# Patient Record
Sex: MALE | Race: OTHER | NOT HISPANIC OR LATINO
[De-identification: names, ages, dates, MRNs, and addresses within clinical notes are randomized per-mention and may not be internally consistent; named-entity substitution may affect disease eponyms.]

---

## 2017-02-28 ENCOUNTER — APPOINTMENT (OUTPATIENT)
Dept: CARDIOLOGY | Facility: CLINIC | Age: 77
End: 2017-02-28

## 2017-03-02 ENCOUNTER — APPOINTMENT (OUTPATIENT)
Dept: CARDIOLOGY | Facility: CLINIC | Age: 77
End: 2017-03-02

## 2017-03-02 ENCOUNTER — NON-APPOINTMENT (OUTPATIENT)
Age: 77
End: 2017-03-02

## 2017-03-02 VITALS — DIASTOLIC BLOOD PRESSURE: 104 MMHG | SYSTOLIC BLOOD PRESSURE: 185 MMHG

## 2017-03-02 VITALS
DIASTOLIC BLOOD PRESSURE: 97 MMHG | WEIGHT: 238.4 LBS | OXYGEN SATURATION: 97 % | TEMPERATURE: 97.4 F | BODY MASS INDEX: 31.6 KG/M2 | SYSTOLIC BLOOD PRESSURE: 195 MMHG | HEART RATE: 54 BPM | HEIGHT: 73 IN

## 2017-03-02 LAB — INR PPP: 1.8

## 2017-03-03 LAB
ALBUMIN SERPL ELPH-MCNC: 4 G/DL
ALP BLD-CCNC: 81 U/L
ALT SERPL-CCNC: 22 U/L
ANION GAP SERPL CALC-SCNC: 18 MMOL/L
AST SERPL-CCNC: 29 U/L
BASOPHILS # BLD AUTO: 0.01 K/UL
BASOPHILS NFR BLD AUTO: 0.2 %
BILIRUB SERPL-MCNC: 0.6 MG/DL
BUN SERPL-MCNC: 17 MG/DL
CALCIUM SERPL-MCNC: 9.2 MG/DL
CHLORIDE SERPL-SCNC: 105 MMOL/L
CHOLEST SERPL-MCNC: 249 MG/DL
CHOLEST/HDLC SERPL: 3.1 RATIO
CO2 SERPL-SCNC: 21 MMOL/L
CREAT SERPL-MCNC: 1.09 MG/DL
EOSINOPHIL # BLD AUTO: 0.12 K/UL
EOSINOPHIL NFR BLD AUTO: 2.5 %
GLUCOSE SERPL-MCNC: 101 MG/DL
HBA1C MFR BLD HPLC: 5.6 %
HCT VFR BLD CALC: 46.6 %
HDLC SERPL-MCNC: 81 MG/DL
HGB BLD-MCNC: 15.2 G/DL
IMM GRANULOCYTES NFR BLD AUTO: 0 %
LDLC SERPL CALC-MCNC: 154 MG/DL
LYMPHOCYTES # BLD AUTO: 0.67 K/UL
LYMPHOCYTES NFR BLD AUTO: 14 %
MAN DIFF?: NORMAL
MCHC RBC-ENTMCNC: 30.1 PG
MCHC RBC-ENTMCNC: 32.6 GM/DL
MCV RBC AUTO: 92.3 FL
MONOCYTES # BLD AUTO: 0.48 K/UL
MONOCYTES NFR BLD AUTO: 10.1 %
NEUTROPHILS # BLD AUTO: 3.49 K/UL
NEUTROPHILS NFR BLD AUTO: 73.2 %
PLATELET # BLD AUTO: 217 K/UL
POTASSIUM SERPL-SCNC: 4.7 MMOL/L
PROT SERPL-MCNC: 6.5 G/DL
PSA FREE FLD-MCNC: 25.4 %
PSA FREE SERPL-MCNC: 0.77 NG/ML
PSA SERPL-MCNC: 3.03 NG/ML
RBC # BLD: 5.05 M/UL
RBC # FLD: 14.2 %
SODIUM SERPL-SCNC: 144 MMOL/L
T4 SERPL-MCNC: 6 UG/DL
TRIGL SERPL-MCNC: 72 MG/DL
TSH SERPL-ACNC: 2.49 UIU/ML
WBC # FLD AUTO: 4.77 K/UL

## 2017-06-06 ENCOUNTER — NON-APPOINTMENT (OUTPATIENT)
Age: 77
End: 2017-06-06

## 2017-06-06 ENCOUNTER — APPOINTMENT (OUTPATIENT)
Dept: CARDIOLOGY | Facility: CLINIC | Age: 77
End: 2017-06-06

## 2017-06-06 VITALS
WEIGHT: 231 LBS | HEART RATE: 63 BPM | BODY MASS INDEX: 30.48 KG/M2 | OXYGEN SATURATION: 96 % | SYSTOLIC BLOOD PRESSURE: 180 MMHG | DIASTOLIC BLOOD PRESSURE: 82 MMHG

## 2017-06-06 VITALS — SYSTOLIC BLOOD PRESSURE: 146 MMHG | DIASTOLIC BLOOD PRESSURE: 86 MMHG

## 2017-06-06 LAB — INR PPP: 2.3 RATIO

## 2017-09-07 ENCOUNTER — NON-APPOINTMENT (OUTPATIENT)
Age: 77
End: 2017-09-07

## 2017-09-07 ENCOUNTER — APPOINTMENT (OUTPATIENT)
Dept: CARDIOLOGY | Facility: CLINIC | Age: 77
End: 2017-09-07
Payer: MEDICARE

## 2017-09-07 VITALS
OXYGEN SATURATION: 97 % | DIASTOLIC BLOOD PRESSURE: 84 MMHG | WEIGHT: 223 LBS | HEART RATE: 60 BPM | HEIGHT: 73 IN | BODY MASS INDEX: 29.55 KG/M2 | SYSTOLIC BLOOD PRESSURE: 142 MMHG

## 2017-09-07 PROCEDURE — 93000 ELECTROCARDIOGRAM COMPLETE: CPT

## 2017-09-07 PROCEDURE — 99215 OFFICE O/P EST HI 40 MIN: CPT

## 2017-09-18 ENCOUNTER — RX RENEWAL (OUTPATIENT)
Age: 77
End: 2017-09-18

## 2017-10-30 ENCOUNTER — APPOINTMENT (OUTPATIENT)
Dept: CARDIOLOGY | Facility: CLINIC | Age: 77
End: 2017-10-30
Payer: MEDICARE

## 2017-10-30 PROCEDURE — G0008: CPT

## 2017-10-30 PROCEDURE — 85610 PROTHROMBIN TIME: CPT | Mod: QW

## 2017-10-30 PROCEDURE — 99211 OFF/OP EST MAY X REQ PHY/QHP: CPT

## 2017-10-30 PROCEDURE — 90686 IIV4 VACC NO PRSV 0.5 ML IM: CPT

## 2017-10-31 VITALS — HEART RATE: 59 BPM | OXYGEN SATURATION: 97 %

## 2017-10-31 LAB — INR PPP: 2.5 RATIO

## 2017-12-11 ENCOUNTER — APPOINTMENT (OUTPATIENT)
Dept: CARDIOLOGY | Facility: CLINIC | Age: 77
End: 2017-12-11
Payer: MEDICARE

## 2017-12-11 ENCOUNTER — NON-APPOINTMENT (OUTPATIENT)
Age: 77
End: 2017-12-11

## 2017-12-11 VITALS
WEIGHT: 223 LBS | SYSTOLIC BLOOD PRESSURE: 167 MMHG | HEART RATE: 62 BPM | HEIGHT: 73 IN | BODY MASS INDEX: 29.55 KG/M2 | OXYGEN SATURATION: 94 % | DIASTOLIC BLOOD PRESSURE: 85 MMHG

## 2017-12-11 VITALS — DIASTOLIC BLOOD PRESSURE: 84 MMHG | SYSTOLIC BLOOD PRESSURE: 142 MMHG

## 2017-12-11 VITALS — HEART RATE: 57 BPM

## 2017-12-11 VITALS — OXYGEN SATURATION: 98 %

## 2017-12-11 LAB — INR PPP: 1.6 RATIO

## 2017-12-11 PROCEDURE — 93000 ELECTROCARDIOGRAM COMPLETE: CPT

## 2017-12-11 PROCEDURE — 85610 PROTHROMBIN TIME: CPT | Mod: QW

## 2017-12-11 PROCEDURE — 99214 OFFICE O/P EST MOD 30 MIN: CPT

## 2017-12-30 ENCOUNTER — RX RENEWAL (OUTPATIENT)
Age: 77
End: 2017-12-30

## 2018-01-18 ENCOUNTER — APPOINTMENT (OUTPATIENT)
Dept: CARDIOLOGY | Facility: CLINIC | Age: 78
End: 2018-01-18
Payer: MEDICARE

## 2018-01-18 VITALS — SYSTOLIC BLOOD PRESSURE: 122 MMHG | OXYGEN SATURATION: 98 % | DIASTOLIC BLOOD PRESSURE: 70 MMHG | HEART RATE: 58 BPM

## 2018-01-18 LAB — INR PPP: 2.2 RATIO

## 2018-01-18 PROCEDURE — 85610 PROTHROMBIN TIME: CPT | Mod: QW

## 2018-01-18 PROCEDURE — 99211 OFF/OP EST MAY X REQ PHY/QHP: CPT

## 2018-02-21 ENCOUNTER — APPOINTMENT (OUTPATIENT)
Dept: CARDIOLOGY | Facility: CLINIC | Age: 78
End: 2018-02-21
Payer: MEDICARE

## 2018-02-21 VITALS — DIASTOLIC BLOOD PRESSURE: 62 MMHG | HEART RATE: 56 BPM | SYSTOLIC BLOOD PRESSURE: 124 MMHG | OXYGEN SATURATION: 97 %

## 2018-02-21 LAB — INR PPP: 2.2 RATIO

## 2018-02-21 PROCEDURE — 99211 OFF/OP EST MAY X REQ PHY/QHP: CPT

## 2018-02-21 PROCEDURE — 85610 PROTHROMBIN TIME: CPT | Mod: QW

## 2018-03-21 ENCOUNTER — APPOINTMENT (OUTPATIENT)
Dept: CARDIOLOGY | Facility: CLINIC | Age: 78
End: 2018-03-21

## 2018-04-09 ENCOUNTER — RX RENEWAL (OUTPATIENT)
Age: 78
End: 2018-04-09

## 2018-04-20 ENCOUNTER — OTHER (OUTPATIENT)
Age: 78
End: 2018-04-20

## 2018-04-24 ENCOUNTER — APPOINTMENT (OUTPATIENT)
Dept: CARDIOLOGY | Facility: CLINIC | Age: 78
End: 2018-04-24
Payer: MEDICARE

## 2018-04-24 ENCOUNTER — NON-APPOINTMENT (OUTPATIENT)
Age: 78
End: 2018-04-24

## 2018-04-24 VITALS
OXYGEN SATURATION: 97 % | HEART RATE: 66 BPM | SYSTOLIC BLOOD PRESSURE: 126 MMHG | BODY MASS INDEX: 29.69 KG/M2 | HEIGHT: 73 IN | WEIGHT: 224 LBS | DIASTOLIC BLOOD PRESSURE: 78 MMHG

## 2018-04-24 DIAGNOSIS — Z86.39 PERSONAL HISTORY OF OTHER ENDOCRINE, NUTRITIONAL AND METABOLIC DISEASE: ICD-10-CM

## 2018-04-24 LAB — INR PPP: 2.1 RATIO

## 2018-04-24 PROCEDURE — 93000 ELECTROCARDIOGRAM COMPLETE: CPT

## 2018-04-24 PROCEDURE — 99215 OFFICE O/P EST HI 40 MIN: CPT

## 2018-05-25 ENCOUNTER — APPOINTMENT (OUTPATIENT)
Dept: CARDIOLOGY | Facility: CLINIC | Age: 78
End: 2018-05-25
Payer: MEDICARE

## 2018-05-25 LAB — INR PPP: 1.8 RATIO

## 2018-05-25 PROCEDURE — 99211 OFF/OP EST MAY X REQ PHY/QHP: CPT

## 2018-05-25 PROCEDURE — 85610 PROTHROMBIN TIME: CPT | Mod: QW

## 2018-05-26 ENCOUNTER — RX RENEWAL (OUTPATIENT)
Age: 78
End: 2018-05-26

## 2018-06-11 ENCOUNTER — APPOINTMENT (OUTPATIENT)
Dept: INTERNAL MEDICINE | Facility: CLINIC | Age: 78
End: 2018-06-11
Payer: MEDICARE

## 2018-06-11 ENCOUNTER — LABORATORY RESULT (OUTPATIENT)
Age: 78
End: 2018-06-11

## 2018-06-11 PROCEDURE — 45380 COLONOSCOPY AND BIOPSY: CPT | Mod: 59

## 2018-06-11 PROCEDURE — 45385 COLONOSCOPY W/LESION REMOVAL: CPT

## 2018-07-25 ENCOUNTER — NON-APPOINTMENT (OUTPATIENT)
Age: 78
End: 2018-07-25

## 2018-07-25 ENCOUNTER — APPOINTMENT (OUTPATIENT)
Dept: CARDIOLOGY | Facility: CLINIC | Age: 78
End: 2018-07-25
Payer: MEDICARE

## 2018-07-25 VITALS
WEIGHT: 220 LBS | OXYGEN SATURATION: 97 % | DIASTOLIC BLOOD PRESSURE: 85 MMHG | BODY MASS INDEX: 29.16 KG/M2 | SYSTOLIC BLOOD PRESSURE: 148 MMHG | HEIGHT: 73 IN | HEART RATE: 62 BPM

## 2018-07-25 DIAGNOSIS — Z12.11 ENCOUNTER FOR SCREENING FOR MALIGNANT NEOPLASM OF COLON: ICD-10-CM

## 2018-07-25 LAB — INR PPP: 2.2 RATIO

## 2018-07-25 PROCEDURE — 93000 ELECTROCARDIOGRAM COMPLETE: CPT

## 2018-07-25 PROCEDURE — 99214 OFFICE O/P EST MOD 30 MIN: CPT

## 2018-09-02 ENCOUNTER — RX RENEWAL (OUTPATIENT)
Age: 78
End: 2018-09-02

## 2018-11-24 ENCOUNTER — RX RENEWAL (OUTPATIENT)
Age: 78
End: 2018-11-24

## 2018-11-28 ENCOUNTER — NON-APPOINTMENT (OUTPATIENT)
Age: 78
End: 2018-11-28

## 2018-11-28 ENCOUNTER — APPOINTMENT (OUTPATIENT)
Dept: CARDIOLOGY | Facility: CLINIC | Age: 78
End: 2018-11-28
Payer: MEDICARE

## 2018-11-28 VITALS — DIASTOLIC BLOOD PRESSURE: 84 MMHG | SYSTOLIC BLOOD PRESSURE: 152 MMHG

## 2018-11-28 VITALS
BODY MASS INDEX: 30.22 KG/M2 | HEART RATE: 60 BPM | DIASTOLIC BLOOD PRESSURE: 84 MMHG | SYSTOLIC BLOOD PRESSURE: 175 MMHG | WEIGHT: 228 LBS | OXYGEN SATURATION: 97 % | HEIGHT: 73 IN

## 2018-11-28 LAB — INR PPP: 3 RATIO

## 2018-11-28 PROCEDURE — 99215 OFFICE O/P EST HI 40 MIN: CPT

## 2018-11-28 PROCEDURE — 93000 ELECTROCARDIOGRAM COMPLETE: CPT

## 2018-11-28 NOTE — DISCUSSION/SUMMARY
[FreeTextEntry1] : The patient was examined. His blood pressure was 152/84 .His pulse was 62. His lungs were clear to auscultation. Cardiac exam was negative for murmurs rubs or gallops. His extremities showed no cyanosis or clubbing.  His EKG showed sinus rhythm,and an IVCD of the left bundle branch block type.. His INR today was 3.0 The patient was told to stop his warfarin for 5 days prior to having Mohs surgery. He is medically cleared for the surgery should he require clearance..He will return in 4 months, or earlier if needed. He will return for an INR approximately one month. We will continue to manage Coumadin in this medically complicated and poorly compliant patient.

## 2018-11-28 NOTE — REASON FOR VISIT
[FreeTextEntry1] : The patient comes in for follow-up of His hypertension,deep vein thrombosis, long-term anticoagulant use, hyperlipidemia, and osteoarthritis. He denies any chest pains, shortness of breath, palpitations, or leg pains or swelling.  His INR today was  3.0 The patient went to a dermatologist about a skin lesion turned out to be skin cancer. He will be going for Mohs surgery.

## 2018-11-28 NOTE — PHYSICAL EXAM
[General Appearance - Well Developed] : well developed [Normal Appearance] : normal appearance [Well Groomed] : well groomed [General Appearance - Well Nourished] : well nourished [No Deformities] : no deformities [General Appearance - In No Acute Distress] : no acute distress [Normal Conjunctiva] : the conjunctiva exhibited no abnormalities [Eyelids - No Xanthelasma] : the eyelids demonstrated no xanthelasmas [Normal Oral Mucosa] : normal oral mucosa [No Oral Pallor] : no oral pallor [No Oral Cyanosis] : no oral cyanosis [Normal Jugular Venous A Waves Present] : normal jugular venous A waves present [Normal Jugular Venous V Waves Present] : normal jugular venous V waves present [No Jugular Venous Cardona A Waves] : no jugular venous cardona A waves [Respiration, Rhythm And Depth] : normal respiratory rhythm and effort [Exaggerated Use Of Accessory Muscles For Inspiration] : no accessory muscle use [Auscultation Breath Sounds / Voice Sounds] : lungs were clear to auscultation bilaterally [Heart Rate And Rhythm] : heart rate and rhythm were normal [Heart Sounds] : normal S1 and S2 [Murmurs] : no murmurs present [Abdomen Soft] : soft [Abdomen Tenderness] : non-tender [Abdomen Mass (___ Cm)] : no abdominal mass palpated [Abnormal Walk] : normal gait [Gait - Sufficient For Exercise Testing] : the gait was sufficient for exercise testing [Nail Clubbing] : no clubbing of the fingernails [Cyanosis, Localized] : no localized cyanosis [Petechial Hemorrhages (___cm)] : no petechial hemorrhages [Skin Color & Pigmentation] : normal skin color and pigmentation [] : no rash [No Venous Stasis] : no venous stasis [Skin Lesions] : no skin lesions [No Skin Ulcers] : no skin ulcer [No Xanthoma] : no  xanthoma was observed [Oriented To Time, Place, And Person] : oriented to person, place, and time [Affect] : the affect was normal [Mood] : the mood was normal [No Anxiety] : not feeling anxious [FreeTextEntry1] : There is decreased hearing bilaterally.

## 2018-11-28 NOTE — REVIEW OF SYSTEMS
[Loss Of Hearing] : hearing loss [see HPI] : see HPI [Joint Pain] : joint pain [Negative] : Heme/Lymph [Shortness Of Breath] : no shortness of breath [Chest Pain] : no chest pain [Palpitations] : no palpitations

## 2019-01-14 ENCOUNTER — OTHER (OUTPATIENT)
Age: 79
End: 2019-01-14

## 2019-01-14 ENCOUNTER — APPOINTMENT (OUTPATIENT)
Dept: CARDIOLOGY | Facility: CLINIC | Age: 79
End: 2019-01-14
Payer: MEDICARE

## 2019-01-14 LAB — INR PPP: 2.1 RATIO

## 2019-01-14 PROCEDURE — 93793 ANTICOAG MGMT PT WARFARIN: CPT

## 2019-01-14 PROCEDURE — 85610 PROTHROMBIN TIME: CPT | Mod: QW

## 2019-01-15 LAB
ALBUMIN SERPL ELPH-MCNC: 4.7 G/DL
ALP BLD-CCNC: 83 U/L
ALT SERPL-CCNC: 26 U/L
ANION GAP SERPL CALC-SCNC: 9 MMOL/L
AST SERPL-CCNC: 27 U/L
BASOPHILS # BLD AUTO: 0.01 K/UL
BASOPHILS NFR BLD AUTO: 0.2 %
BILIRUB SERPL-MCNC: 0.5 MG/DL
BUN SERPL-MCNC: 29 MG/DL
CALCIUM SERPL-MCNC: 9.7 MG/DL
CHLORIDE SERPL-SCNC: 104 MMOL/L
CHOLEST SERPL-MCNC: 236 MG/DL
CHOLEST/HDLC SERPL: 2.7 RATIO
CO2 SERPL-SCNC: 29 MMOL/L
CREAT SERPL-MCNC: 1.3 MG/DL
EOSINOPHIL # BLD AUTO: 0.16 K/UL
EOSINOPHIL NFR BLD AUTO: 3.5 %
ESTIMATED AVERAGE GLUCOSE: 108 MG/DL
GLUCOSE SERPL-MCNC: 95 MG/DL
HBA1C MFR BLD HPLC: 5.4 %
HCT VFR BLD CALC: 47.1 %
HDLC SERPL-MCNC: 89 MG/DL
HGB BLD-MCNC: 14.8 G/DL
IMM GRANULOCYTES NFR BLD AUTO: 0.6 %
LDLC SERPL CALC-MCNC: 132 MG/DL
LYMPHOCYTES # BLD AUTO: 0.8 K/UL
LYMPHOCYTES NFR BLD AUTO: 17.3 %
MAN DIFF?: NORMAL
MCHC RBC-ENTMCNC: 29.8 PG
MCHC RBC-ENTMCNC: 31.4 GM/DL
MCV RBC AUTO: 94.8 FL
MONOCYTES # BLD AUTO: 0.47 K/UL
MONOCYTES NFR BLD AUTO: 10.2 %
NEUTROPHILS # BLD AUTO: 3.16 K/UL
NEUTROPHILS NFR BLD AUTO: 68.2 %
PLATELET # BLD AUTO: 240 K/UL
POTASSIUM SERPL-SCNC: 4.4 MMOL/L
PROT SERPL-MCNC: 7.1 G/DL
PSA FREE FLD-MCNC: 18 %
PSA FREE SERPL-MCNC: 0.77 NG/ML
PSA SERPL-MCNC: 4.22 NG/ML
RBC # BLD: 4.97 M/UL
RBC # FLD: 14.9 %
SODIUM SERPL-SCNC: 142 MMOL/L
T4 SERPL-MCNC: 6.6 UG/DL
TRIGL SERPL-MCNC: 73 MG/DL
TSH SERPL-ACNC: 2.51 UIU/ML
WBC # FLD AUTO: 4.63 K/UL

## 2019-02-13 ENCOUNTER — APPOINTMENT (OUTPATIENT)
Dept: CARDIOLOGY | Facility: CLINIC | Age: 79
End: 2019-02-13
Payer: MEDICARE

## 2019-02-13 VITALS
OXYGEN SATURATION: 95 % | HEART RATE: 64 BPM | DIASTOLIC BLOOD PRESSURE: 80 MMHG | HEIGHT: 73 IN | BODY MASS INDEX: 29.95 KG/M2 | SYSTOLIC BLOOD PRESSURE: 141 MMHG | WEIGHT: 226 LBS

## 2019-02-13 LAB — INR PPP: 1.2 RATIO

## 2019-02-13 PROCEDURE — 99214 OFFICE O/P EST MOD 30 MIN: CPT

## 2019-02-13 PROCEDURE — 85610 PROTHROMBIN TIME: CPT | Mod: QW

## 2019-02-15 ENCOUNTER — APPOINTMENT (OUTPATIENT)
Dept: CARDIOLOGY | Facility: CLINIC | Age: 79
End: 2019-02-15
Payer: MEDICARE

## 2019-02-15 VITALS
HEART RATE: 70 BPM | HEIGHT: 73 IN | DIASTOLIC BLOOD PRESSURE: 77 MMHG | BODY MASS INDEX: 29.95 KG/M2 | OXYGEN SATURATION: 97 % | SYSTOLIC BLOOD PRESSURE: 162 MMHG | WEIGHT: 226 LBS

## 2019-02-15 PROCEDURE — 93970 EXTREMITY STUDY: CPT

## 2019-02-15 PROCEDURE — 99215 OFFICE O/P EST HI 40 MIN: CPT

## 2019-02-15 NOTE — REASON FOR VISIT
[Acute Exacerbation] : an acute exacerbation of [FreeTextEntry2] : DVT [FreeTextEntry1] : The patient comes in for follow-up of his hypertension, deep vein thrombosis, long-term anticoagulant use, hyperlipidemia, and osteoarthritis. He denies any chest pains, shortness of breath, palpitations. His INR two days ago was 1.2. The patient went to a dermatologist about a skin lesion turned out to be skin cancer. He went  for Mohs surgery. He stopped his Coumadin for 10 days. He noticed his legs were swollen and he went on the Internet and he was under the impression that maybe the Coumadin was too high a dose so he lowered it to 7-1/2 mg after taking 10 mg twice. He now comes for followup.\par \par Patient seen to have bilateral LE DVT on venous duplex this AM.\par He has no chest pains or shortness of breath. No pleuritic chest discomfort.

## 2019-02-15 NOTE — REASON FOR VISIT
[FreeTextEntry1] : The patient comes in for follow-up of his hypertension,deep vein thrombosis, long-term anticoagulant use, hyperlipidemia, and osteoarthritis. He denies any chest pains, shortness of breath, palpitations, His INR today was 1.2  The patient went to a dermatologist about a skin lesion turned out to be skin cancer. He went  for Mohs surgery. He stopped his Coumadin for 10 days.He noticed his legs were swollen and he went on the Internet and he was under the impression that maybe the Coumadin was too high a dose so he lowered it to 7-1/2 mg after taking 10 mg twice. He now comes for followup.

## 2019-02-15 NOTE — PHYSICAL EXAM
[General Appearance - Well Developed] : well developed [Normal Appearance] : normal appearance [Well Groomed] : well groomed [General Appearance - Well Nourished] : well nourished [No Deformities] : no deformities [General Appearance - In No Acute Distress] : no acute distress [Normal Conjunctiva] : the conjunctiva exhibited no abnormalities [Eyelids - No Xanthelasma] : the eyelids demonstrated no xanthelasmas [Normal Oral Mucosa] : normal oral mucosa [No Oral Pallor] : no oral pallor [No Oral Cyanosis] : no oral cyanosis [Normal Jugular Venous A Waves Present] : normal jugular venous A waves present [Normal Jugular Venous V Waves Present] : normal jugular venous V waves present [No Jugular Venous Cardona A Waves] : no jugular venous cardona A waves [Respiration, Rhythm And Depth] : normal respiratory rhythm and effort [Exaggerated Use Of Accessory Muscles For Inspiration] : no accessory muscle use [Auscultation Breath Sounds / Voice Sounds] : lungs were clear to auscultation bilaterally [Heart Rate And Rhythm] : heart rate and rhythm were normal [Heart Sounds] : normal S1 and S2 [Murmurs] : no murmurs present [Abdomen Soft] : soft [Abdomen Tenderness] : non-tender [Abdomen Mass (___ Cm)] : no abdominal mass palpated [Abnormal Walk] : normal gait [Gait - Sufficient For Exercise Testing] : the gait was sufficient for exercise testing [Nail Clubbing] : no clubbing of the fingernails [Cyanosis, Localized] : no localized cyanosis [Petechial Hemorrhages (___cm)] : no petechial hemorrhages [Skin Color & Pigmentation] : normal skin color and pigmentation [] : no rash [No Venous Stasis] : no venous stasis [Skin Lesions] : no skin lesions [No Skin Ulcers] : no skin ulcer [No Xanthoma] : no  xanthoma was observed [Oriented To Time, Place, And Person] : oriented to person, place, and time [Affect] : the affect was normal [Mood] : the mood was normal [No Anxiety] : not feeling anxious [FreeTextEntry1] : bilateral lower extremity edema

## 2019-02-15 NOTE — DISCUSSION/SUMMARY
[FreeTextEntry1] : Patient seen for visit after being seen to have bilateral DVT.\par Heart rate is normal. Oxygen saturation is normal on room air.\par \par The patient was examined. His blood pressure was 162/77 mmHg. His pulse was 70 bpm. His lungs were clear to auscultation. Cardiac exam was negative for murmurs rubs or gallops. His extremities showed no cyanosis or clubbing. The patient was told to stop his Coumadin (he has not taken any since Tuesday). He has taken two days of Xarelto 20 mg. We will now start him on Xarelto 15 mg BID for 21 days (three weeks of samples provided) and then either use Lovenox to bridge back to warfarin or continue with Xarelto at 20 mg daily thereafter. He will return in three weeks to see Kamari López MD.

## 2019-02-15 NOTE — DISCUSSION/SUMMARY
[FreeTextEntry1] : The patient was examined. His blood pressure was 152/84 .His pulse was 62. His lungs were clear to auscultation. Cardiac exam was negative for murmurs rubs or gallops. His extremities showed no cyanosis or clubbing. . He has clots in superficial veins in his lower extremities.His INR today was 1.2 The patient was told to stop his Coumadin. We will start him on Xarelto  20 mg daily. He will return in one month earlier if needed. We will get a venous Doppler to check for deep vein thrombosis.

## 2019-03-11 ENCOUNTER — MEDICATION RENEWAL (OUTPATIENT)
Age: 79
End: 2019-03-11

## 2019-03-11 ENCOUNTER — CLINICAL ADVICE (OUTPATIENT)
Age: 79
End: 2019-03-11

## 2019-03-13 ENCOUNTER — APPOINTMENT (OUTPATIENT)
Dept: CARDIOLOGY | Facility: CLINIC | Age: 79
End: 2019-03-13

## 2019-04-01 ENCOUNTER — NON-APPOINTMENT (OUTPATIENT)
Age: 79
End: 2019-04-01

## 2019-04-01 ENCOUNTER — APPOINTMENT (OUTPATIENT)
Dept: CARDIOLOGY | Facility: CLINIC | Age: 79
End: 2019-04-01
Payer: MEDICARE

## 2019-04-01 VITALS — DIASTOLIC BLOOD PRESSURE: 90 MMHG | SYSTOLIC BLOOD PRESSURE: 156 MMHG

## 2019-04-01 VITALS
SYSTOLIC BLOOD PRESSURE: 169 MMHG | WEIGHT: 232 LBS | HEART RATE: 63 BPM | OXYGEN SATURATION: 95 % | DIASTOLIC BLOOD PRESSURE: 95 MMHG | BODY MASS INDEX: 30.61 KG/M2

## 2019-04-01 LAB — INR PPP: 1 RATIO

## 2019-04-01 PROCEDURE — 93000 ELECTROCARDIOGRAM COMPLETE: CPT

## 2019-04-01 PROCEDURE — 99214 OFFICE O/P EST MOD 30 MIN: CPT

## 2019-04-01 NOTE — REASON FOR VISIT
[Acute Exacerbation] : an acute exacerbation of [FreeTextEntry2] : DVT [FreeTextEntry1] : The patient comes in for follow-up of his hypertension, deep vein thrombosis, long-term anticoagulant use, hyperlipidemia, and osteoarthritis. He denies any chest pains, shortness of breath, palpitations. His INR two days ago was 1.2. The patient went to a dermatologist about a skin lesion turned out to be skin cancer. He went  for Mohs surgery. He stopped his Coumadin for 10 days. He noticed his legs were swollen and he went on the Internet and he was under the impression that maybe the Coumadin was too high a dose so he lowered it to 7-1/2 mg after taking 10 mg twice. He now comes for followup.\par \par Patient seen to have bilateral LE DVT on venous duplex \par He has no chest pains or shortness of breath. No pleuritic chest discomfort.\par April 1, 2019: Patient had blood in his urine and he stopped the Xarelto  20 mg for one day. The blood went away and he restarted it. He denies any leg pains, chest pains, or shortness of breath.

## 2019-04-01 NOTE — DISCUSSION/SUMMARY
[FreeTextEntry1] : \par \par The patient was examined. His blood pressure was 156/90 mmHg. His pulse was 63 bpm. His lungs were clear to auscultation. Cardiac exam was negative for murmurs rubs or gallops. His extremities showed no cyanosis or clubbing.His EKG showed normal sinus rhythm with an IVCD of the left bundle branch block  type. No acute changes were seen. We will continue him on Xarelto at 20 mg daily.  He will return in 1 month.

## 2019-04-09 ENCOUNTER — RX RENEWAL (OUTPATIENT)
Age: 79
End: 2019-04-09

## 2019-05-01 ENCOUNTER — APPOINTMENT (OUTPATIENT)
Dept: CARDIOLOGY | Facility: CLINIC | Age: 79
End: 2019-05-01
Payer: MEDICARE

## 2019-05-01 ENCOUNTER — NON-APPOINTMENT (OUTPATIENT)
Age: 79
End: 2019-05-01

## 2019-05-01 VITALS
OXYGEN SATURATION: 97 % | SYSTOLIC BLOOD PRESSURE: 155 MMHG | HEIGHT: 73 IN | DIASTOLIC BLOOD PRESSURE: 93 MMHG | HEART RATE: 66 BPM | WEIGHT: 232 LBS | BODY MASS INDEX: 30.75 KG/M2

## 2019-05-01 DIAGNOSIS — K08.89 OTHER SPECIFIED DISORDERS OF TEETH AND SUPPORTING STRUCTURES: ICD-10-CM

## 2019-05-01 DIAGNOSIS — Z87.448 PERSONAL HISTORY OF OTHER DISEASES OF URINARY SYSTEM: ICD-10-CM

## 2019-05-01 LAB — INR PPP: 1.1 RATIO

## 2019-05-01 PROCEDURE — 99214 OFFICE O/P EST MOD 30 MIN: CPT

## 2019-05-01 PROCEDURE — 93000 ELECTROCARDIOGRAM COMPLETE: CPT

## 2019-05-01 NOTE — REVIEW OF SYSTEMS
[Loss Of Hearing] : hearing loss [Joint Pain] : joint pain [see HPI] : see HPI [Negative] : Heme/Lymph [Shortness Of Breath] : no shortness of breath [Chest Pain] : no chest pain [Palpitations] : no palpitations

## 2019-05-01 NOTE — PHYSICAL EXAM
[General Appearance - Well Developed] : well developed [Normal Appearance] : normal appearance [Well Groomed] : well groomed [General Appearance - Well Nourished] : well nourished [No Deformities] : no deformities [General Appearance - In No Acute Distress] : no acute distress [Normal Conjunctiva] : the conjunctiva exhibited no abnormalities [Eyelids - No Xanthelasma] : the eyelids demonstrated no xanthelasmas [Normal Oral Mucosa] : normal oral mucosa [No Oral Cyanosis] : no oral cyanosis [No Oral Pallor] : no oral pallor [Normal Jugular Venous A Waves Present] : normal jugular venous A waves present [No Jugular Venous Cardona A Waves] : no jugular venous cardona A waves [Normal Jugular Venous V Waves Present] : normal jugular venous V waves present [Respiration, Rhythm And Depth] : normal respiratory rhythm and effort [Exaggerated Use Of Accessory Muscles For Inspiration] : no accessory muscle use [Heart Rate And Rhythm] : heart rate and rhythm were normal [Auscultation Breath Sounds / Voice Sounds] : lungs were clear to auscultation bilaterally [Heart Sounds] : normal S1 and S2 [Murmurs] : no murmurs present [Abdomen Soft] : soft [Abdomen Tenderness] : non-tender [Abdomen Mass (___ Cm)] : no abdominal mass palpated [Gait - Sufficient For Exercise Testing] : the gait was sufficient for exercise testing [Abnormal Walk] : normal gait [Cyanosis, Localized] : no localized cyanosis [Nail Clubbing] : no clubbing of the fingernails [Petechial Hemorrhages (___cm)] : no petechial hemorrhages [] : no rash [Skin Color & Pigmentation] : normal skin color and pigmentation [No Skin Ulcers] : no skin ulcer [No Venous Stasis] : no venous stasis [Skin Lesions] : no skin lesions [Affect] : the affect was normal [No Xanthoma] : no  xanthoma was observed [Oriented To Time, Place, And Person] : oriented to person, place, and time [Mood] : the mood was normal [No Anxiety] : not feeling anxious [FreeTextEntry1] : bilateral lower extremity edema

## 2019-05-01 NOTE — DISCUSSION/SUMMARY
[FreeTextEntry1] : \par \par The patient was examined. His blood pressure was 155/93 mmHg. His pulse was 66 bpm. His lungs were clear to auscultation. Cardiac exam was negative for murmurs rubs or gallops. His extremities showed no cyanosis or clubbing.His EKG showed normal sinus rhythm with an IVCD of the left bundle branch block  type. No acute changes were seen. We will continue him on Xarelto at 20 mg daily.  He will return in 2 months.

## 2019-05-15 ENCOUNTER — MEDICATION RENEWAL (OUTPATIENT)
Age: 79
End: 2019-05-15

## 2019-05-16 ENCOUNTER — RX RENEWAL (OUTPATIENT)
Age: 79
End: 2019-05-16

## 2019-07-09 ENCOUNTER — APPOINTMENT (OUTPATIENT)
Dept: CARDIOLOGY | Facility: CLINIC | Age: 79
End: 2019-07-09

## 2019-07-22 ENCOUNTER — APPOINTMENT (OUTPATIENT)
Dept: CARDIOLOGY | Facility: CLINIC | Age: 79
End: 2019-07-22
Payer: MEDICARE

## 2019-07-22 VITALS
OXYGEN SATURATION: 96 % | HEIGHT: 73 IN | BODY MASS INDEX: 29.95 KG/M2 | DIASTOLIC BLOOD PRESSURE: 87 MMHG | SYSTOLIC BLOOD PRESSURE: 131 MMHG | WEIGHT: 226 LBS | HEART RATE: 65 BPM

## 2019-07-22 LAB — INR PPP: 1 RATIO

## 2019-07-22 PROCEDURE — 93000 ELECTROCARDIOGRAM COMPLETE: CPT

## 2019-07-22 PROCEDURE — 99214 OFFICE O/P EST MOD 30 MIN: CPT

## 2019-07-22 NOTE — PHYSICAL EXAM
[General Appearance - Well Developed] : well developed [Normal Appearance] : normal appearance [Well Groomed] : well groomed [General Appearance - Well Nourished] : well nourished [No Deformities] : no deformities [General Appearance - In No Acute Distress] : no acute distress [Normal Conjunctiva] : the conjunctiva exhibited no abnormalities [Eyelids - No Xanthelasma] : the eyelids demonstrated no xanthelasmas [Normal Oral Mucosa] : normal oral mucosa [No Oral Pallor] : no oral pallor [No Oral Cyanosis] : no oral cyanosis [Normal Jugular Venous A Waves Present] : normal jugular venous A waves present [Normal Jugular Venous V Waves Present] : normal jugular venous V waves present [No Jugular Venous Cardona A Waves] : no jugular venous cardona A waves [Exaggerated Use Of Accessory Muscles For Inspiration] : no accessory muscle use [Respiration, Rhythm And Depth] : normal respiratory rhythm and effort [Auscultation Breath Sounds / Voice Sounds] : lungs were clear to auscultation bilaterally [Heart Rate And Rhythm] : heart rate and rhythm were normal [Heart Sounds] : normal S1 and S2 [Murmurs] : no murmurs present [Abdomen Soft] : soft [Abdomen Tenderness] : non-tender [Abdomen Mass (___ Cm)] : no abdominal mass palpated [Abnormal Walk] : normal gait [Gait - Sufficient For Exercise Testing] : the gait was sufficient for exercise testing [Nail Clubbing] : no clubbing of the fingernails [Cyanosis, Localized] : no localized cyanosis [Petechial Hemorrhages (___cm)] : no petechial hemorrhages [Skin Color & Pigmentation] : normal skin color and pigmentation [] : no rash [No Venous Stasis] : no venous stasis [Skin Lesions] : no skin lesions [No Skin Ulcers] : no skin ulcer [No Xanthoma] : no  xanthoma was observed [Oriented To Time, Place, And Person] : oriented to person, place, and time [Affect] : the affect was normal [Mood] : the mood was normal [No Anxiety] : not feeling anxious [FreeTextEntry1] : bilateral lower extremity edema

## 2019-07-22 NOTE — DISCUSSION/SUMMARY
[FreeTextEntry1] : \par \par The patient was examined. His blood pressure was 131/87 mmHg. His pulse was 64 bpm. His lungs were clear to auscultation. Cardiac exam was negative for murmurs rubs or gallops. His extremities showed no cyanosis or clubbing.His EKG showed normal sinus rhythm with an IVCD of the left bundle branch block  type. No acute changes were seen. We will continue him on Xarelto at 20 mg daily.  He will return in 4 months.

## 2019-07-22 NOTE — REASON FOR VISIT
[Acute Exacerbation] : an acute exacerbation of [FreeTextEntry1] : The patient comes in for follow-up of his hypertension, deep vein thrombosis, long-term anticoagulant use, hyperlipidemia, and osteoarthritis. He denies any chest pains, shortness of breath, palpitations. His INR two days ago was 1.2. The patient went to a dermatologist about a skin lesion turned out to be skin cancer. He went  for Mohs surgery. He stopped his Coumadin for 10 days. He noticed his legs were swollen and he went on the Internet and he was under the impression that maybe the Coumadin was too high a dose so he lowered it to 7-1/2 mg after taking 10 mg twice. He now comes for followup.\par \par Patient seen to have bilateral LE DVT on venous duplex \par He has no chest pains or shortness of breath. No pleuritic chest discomfort.\par April 1, 2019: Patient had blood in his urine and he stopped the Xarelto  20 mg for one day. The blood went away and he restarted it. He denies any leg pains, chest pains, or shortness of breath.\par July 22, 2019: The patient has no new complaints. He denies any leg swelling, chest pains, shortness of breath, or palpitations. He is taking his Xarelto. [FreeTextEntry2] : DVT

## 2019-09-10 ENCOUNTER — MEDICATION RENEWAL (OUTPATIENT)
Age: 79
End: 2019-09-10

## 2019-11-27 ENCOUNTER — NON-APPOINTMENT (OUTPATIENT)
Age: 79
End: 2019-11-27

## 2019-11-27 ENCOUNTER — APPOINTMENT (OUTPATIENT)
Dept: CARDIOLOGY | Facility: CLINIC | Age: 79
End: 2019-11-27
Payer: MEDICARE

## 2019-11-27 VITALS
DIASTOLIC BLOOD PRESSURE: 86 MMHG | SYSTOLIC BLOOD PRESSURE: 145 MMHG | HEART RATE: 62 BPM | WEIGHT: 225 LBS | HEIGHT: 73 IN | TEMPERATURE: 98.1 F | OXYGEN SATURATION: 96 % | RESPIRATION RATE: 16 BRPM | BODY MASS INDEX: 29.82 KG/M2

## 2019-11-27 DIAGNOSIS — Z91.14 PATIENT'S OTHER NONCOMPLIANCE WITH MEDICATION REGIMEN: ICD-10-CM

## 2019-11-27 PROCEDURE — 99214 OFFICE O/P EST MOD 30 MIN: CPT

## 2019-11-27 PROCEDURE — 93000 ELECTROCARDIOGRAM COMPLETE: CPT

## 2019-12-12 NOTE — DISCUSSION/SUMMARY
[FreeTextEntry1] : \par \par The patient was examined. His blood pressure was 145/86 mmHg. His pulse was 62 bpm. His lungs were clear to auscultation. Cardiac exam was negative for murmurs rubs or gallops. His extremities showed no cyanosis or clubbing.His EKG showed normal sinus rhythm with an IVCD of the left bundle branch block  type. No acute changes were seen. We will continue him on Xarelto at 20 mg daily.  He will return in 6 months.He will followup with a urologist about the gross hematuria. He is medically cleared for surgery to have skin growths removed from his face. He should stop the Xarelto  2 days prior and on the day of the surgery. He should restart it as soon afterwards as the surgeon allows.

## 2019-12-12 NOTE — REASON FOR VISIT
[Acute Exacerbation] : an acute exacerbation of [FreeTextEntry2] : DVT [FreeTextEntry1] : The patient comes in for follow-up of his hypertension, deep vein thrombosis, long-term anticoagulant use, hyperlipidemia, and osteoarthritis. He denies any chest pains, shortness of breath, palpitations. His INR two days ago was 1.2. The patient went to a dermatologist about a skin lesion turned out to be skin cancer. He went  for Mohs surgery. He stopped his Coumadin for 10 days. He noticed his legs were swollen and he went on the Internet and he was under the impression that maybe the Coumadin was too high a dose so he lowered it to 7-1/2 mg after taking 10 mg twice. He now comes for followup.\par \par Patient seen to have bilateral LE DVT on venous duplex \par He has no chest pains or shortness of breath. No pleuritic chest discomfort.\par April 1, 2019: Patient had blood in his urine and he stopped the Xarelto  20 mg for one day. The blood went away and he restarted it. He denies any leg pains, chest pains, or shortness of breath.\par July 22, 2019: The patient has no new complaints. He denies any leg swelling, chest pains, shortness of breath, or palpitations. He is taking his Xarelto.\par November 27, 2019: The patient has intermittent gross hematuria. He holds his Zaroxolyn for a day and it seems to get better. This may happen as frequently as every 3 or 4 days. He denies any chest pains, shortness of breath, or palpitations. He inquired about going back to Coumadin but he was never good at getting blood tests on the Coumadin and I refused to give it to him this is not safe. I told him that he has to go see a urologist about his gross hematuria.

## 2020-02-03 ENCOUNTER — APPOINTMENT (OUTPATIENT)
Dept: CARDIOLOGY | Facility: CLINIC | Age: 80
End: 2020-02-03
Payer: MEDICARE

## 2020-02-03 VITALS
HEART RATE: 67 BPM | WEIGHT: 220 LBS | HEIGHT: 73 IN | BODY MASS INDEX: 29.16 KG/M2 | SYSTOLIC BLOOD PRESSURE: 144 MMHG | OXYGEN SATURATION: 98 % | DIASTOLIC BLOOD PRESSURE: 81 MMHG

## 2020-02-03 DIAGNOSIS — M54.32 SCIATICA, LEFT SIDE: ICD-10-CM

## 2020-02-03 PROCEDURE — 99214 OFFICE O/P EST MOD 30 MIN: CPT

## 2020-02-03 NOTE — PHYSICAL EXAM
[General Appearance - Well Developed] : well developed [Normal Appearance] : normal appearance [General Appearance - Well Nourished] : well nourished [Well Groomed] : well groomed [Normal Conjunctiva] : the conjunctiva exhibited no abnormalities [General Appearance - In No Acute Distress] : no acute distress [No Deformities] : no deformities [Normal Oral Mucosa] : normal oral mucosa [Eyelids - No Xanthelasma] : the eyelids demonstrated no xanthelasmas [No Oral Cyanosis] : no oral cyanosis [No Oral Pallor] : no oral pallor [Normal Jugular Venous V Waves Present] : normal jugular venous V waves present [Normal Jugular Venous A Waves Present] : normal jugular venous A waves present [No Jugular Venous Cardona A Waves] : no jugular venous cardona A waves [Respiration, Rhythm And Depth] : normal respiratory rhythm and effort [Exaggerated Use Of Accessory Muscles For Inspiration] : no accessory muscle use [Heart Rate And Rhythm] : heart rate and rhythm were normal [Auscultation Breath Sounds / Voice Sounds] : lungs were clear to auscultation bilaterally [Heart Sounds] : normal S1 and S2 [Murmurs] : no murmurs present [Abdomen Soft] : soft [Abdomen Tenderness] : non-tender [Abdomen Mass (___ Cm)] : no abdominal mass palpated [Gait - Sufficient For Exercise Testing] : the gait was sufficient for exercise testing [Abnormal Walk] : normal gait [Cyanosis, Localized] : no localized cyanosis [Nail Clubbing] : no clubbing of the fingernails [Skin Color & Pigmentation] : normal skin color and pigmentation [Petechial Hemorrhages (___cm)] : no petechial hemorrhages [] : no rash [No Venous Stasis] : no venous stasis [No Skin Ulcers] : no skin ulcer [Skin Lesions] : no skin lesions [No Xanthoma] : no  xanthoma was observed [Oriented To Time, Place, And Person] : oriented to person, place, and time [Mood] : the mood was normal [Affect] : the affect was normal [No Anxiety] : not feeling anxious [FreeTextEntry1] : bilateral lower extremity edema

## 2020-02-03 NOTE — REVIEW OF SYSTEMS
[Loss Of Hearing] : hearing loss [see HPI] : see HPI [Joint Pain] : joint pain [Negative] : Heme/Lymph [Shortness Of Breath] : no shortness of breath [Palpitations] : no palpitations [Chest Pain] : no chest pain

## 2020-02-03 NOTE — REASON FOR VISIT
[Acute Exacerbation] : an acute exacerbation of [FreeTextEntry2] : DVT [FreeTextEntry1] : The patient comes in for follow-up of his hypertension, deep vein thrombosis, long-term anticoagulant use, hyperlipidemia, and osteoarthritis. He denies any chest pains, shortness of breath, palpitations. His INR two days ago was 1.2. The patient went to a dermatologist about a skin lesion turned out to be skin cancer. He went  for Mohs surgery. He stopped his Coumadin for 10 days. He noticed his legs were swollen and he went on the Internet and he was under the impression that maybe the Coumadin was too high a dose so he lowered it to 7-1/2 mg after taking 10 mg twice. He now comes for followup.\par \par Patient seen to have bilateral LE DVT on venous duplex \par He has no chest pains or shortness of breath. No pleuritic chest discomfort.\par April 1, 2019: Patient had blood in his urine and he stopped the Xarelto  20 mg for one day. The blood went away and he restarted it. He denies any leg pains, chest pains, or shortness of breath.\par July 22, 2019: The patient has no new complaints. He denies any leg swelling, chest pains, shortness of breath, or palpitations. He is taking his Xarelto.\par November 27, 2019: The patient has intermittent gross hematuria. He holds his Zaroxolyn for a day and it seems to get better. This may happen as frequently as every 3 or 4 days. He denies any chest pains, shortness of breath, or palpitations. He inquired about going back to Coumadin but he was never good at getting blood tests on the Coumadin and I refused to give it to him this is not safe. I told him that he has to go see a urologist about his gross hematuria.\par February 3, 2020: The patient complains of pains radiating from his back down his left leg.  It hurts when he stands.  He has been working out in a gym.  He has tried Tylenol but it is not helped.  He is still on the Xarelto.

## 2020-02-03 NOTE — DISCUSSION/SUMMARY
[FreeTextEntry1] : \par \par The patient was examined. His blood pressure was 144/81 mmHg. His pulse was 68 bpm. His lungs were clear to auscultation. Cardiac exam was negative for murmurs rubs or gallops. His extremities showed no cyanosis or clubbing.We will continue him on Xarelto at 20 mg daily.  He will return in 6 months.He will followup with a neurologist about his left-sided sciatica.  We will try him on gabapentin 100 mg 3 times a day.  He may continue with the Tylenol.

## 2020-03-26 NOTE — OBJECTIVE
[History reviewed] : History reviewed. [Medications and Allergies reviewed] : Medications and allergies reviewed. Home

## 2020-05-09 ENCOUNTER — NON-APPOINTMENT (OUTPATIENT)
Age: 80
End: 2020-05-09

## 2020-05-13 ENCOUNTER — NON-APPOINTMENT (OUTPATIENT)
Age: 80
End: 2020-05-13

## 2020-05-23 ENCOUNTER — NON-APPOINTMENT (OUTPATIENT)
Age: 80
End: 2020-05-23

## 2020-06-04 ENCOUNTER — RX RENEWAL (OUTPATIENT)
Age: 80
End: 2020-06-04

## 2020-09-08 ENCOUNTER — RX RENEWAL (OUTPATIENT)
Age: 80
End: 2020-09-08

## 2021-01-21 ENCOUNTER — NON-APPOINTMENT (OUTPATIENT)
Age: 81
End: 2021-01-21

## 2021-01-21 ENCOUNTER — APPOINTMENT (OUTPATIENT)
Dept: CARDIOLOGY | Facility: CLINIC | Age: 81
End: 2021-01-21
Payer: MEDICARE

## 2021-01-21 VITALS
HEART RATE: 68 BPM | OXYGEN SATURATION: 98 % | TEMPERATURE: 97.6 F | BODY MASS INDEX: 27.7 KG/M2 | DIASTOLIC BLOOD PRESSURE: 86 MMHG | HEIGHT: 73 IN | SYSTOLIC BLOOD PRESSURE: 141 MMHG | WEIGHT: 209 LBS

## 2021-01-21 PROCEDURE — 93000 ELECTROCARDIOGRAM COMPLETE: CPT

## 2021-01-21 PROCEDURE — 99214 OFFICE O/P EST MOD 30 MIN: CPT

## 2021-01-21 RX ORDER — HYDROCHLOROTHIAZIDE 12.5 MG/1
12.5 CAPSULE ORAL
Qty: 90 | Refills: 3 | Status: DISCONTINUED | COMMUNITY
Start: 2019-05-16 | End: 2021-01-21

## 2021-01-21 NOTE — PHYSICAL EXAM
[General Appearance - Well Developed] : well developed [Normal Appearance] : normal appearance [Well Groomed] : well groomed [General Appearance - Well Nourished] : well nourished [No Deformities] : no deformities [General Appearance - In No Acute Distress] : no acute distress [Normal Conjunctiva] : the conjunctiva exhibited no abnormalities [Eyelids - No Xanthelasma] : the eyelids demonstrated no xanthelasmas [Normal Oral Mucosa] : normal oral mucosa [No Oral Pallor] : no oral pallor [No Oral Cyanosis] : no oral cyanosis [Normal Jugular Venous A Waves Present] : normal jugular venous A waves present [Normal Jugular Venous V Waves Present] : normal jugular venous V waves present [No Jugular Venous Cardona A Waves] : no jugular venous cardona A waves [Respiration, Rhythm And Depth] : normal respiratory rhythm and effort [Exaggerated Use Of Accessory Muscles For Inspiration] : no accessory muscle use [Auscultation Breath Sounds / Voice Sounds] : lungs were clear to auscultation bilaterally [Heart Rate And Rhythm] : heart rate and rhythm were normal [Heart Sounds] : normal S1 and S2 [Murmurs] : no murmurs present [Abdomen Tenderness] : non-tender [Abdomen Soft] : soft [Abdomen Mass (___ Cm)] : no abdominal mass palpated [Abnormal Walk] : normal gait [Gait - Sufficient For Exercise Testing] : the gait was sufficient for exercise testing [Nail Clubbing] : no clubbing of the fingernails [Cyanosis, Localized] : no localized cyanosis [Petechial Hemorrhages (___cm)] : no petechial hemorrhages [Skin Color & Pigmentation] : normal skin color and pigmentation [] : no rash [Skin Lesions] : no skin lesions [No Venous Stasis] : no venous stasis [No Skin Ulcers] : no skin ulcer [No Xanthoma] : no  xanthoma was observed [Oriented To Time, Place, And Person] : oriented to person, place, and time [Affect] : the affect was normal [Mood] : the mood was normal [No Anxiety] : not feeling anxious [FreeTextEntry1] : bilateral lower extremity edema

## 2021-01-21 NOTE — REASON FOR VISIT
[Acute Exacerbation] : an acute exacerbation of [FreeTextEntry2] : DVT [FreeTextEntry1] : The patient comes in for follow-up of his hypertension, deep vein thrombosis, long-term anticoagulant use, hyperlipidemia, and osteoarthritis. He denies any chest pains, shortness of breath, palpitations. His INR two days ago was 1.2. The patient went to a dermatologist about a skin lesion turned out to be skin cancer. He went  for Mohs surgery. He stopped his Coumadin for 10 days. He noticed his legs were swollen and he went on the Internet and he was under the impression that maybe the Coumadin was too high a dose so he lowered it to 7-1/2 mg after taking 10 mg twice. He now comes for followup.\par \par Patient seen to have bilateral LE DVT on venous duplex \par He has no chest pains or shortness of breath. No pleuritic chest discomfort.\par April 1, 2019: Patient had blood in his urine and he stopped the Xarelto  20 mg for one day. The blood went away and he restarted it. He denies any leg pains, chest pains, or shortness of breath.\par July 22, 2019: The patient has no new complaints. He denies any leg swelling, chest pains, shortness of breath, or palpitations. He is taking his Xarelto.\par November 27, 2019: The patient has intermittent gross hematuria. He holds his Zaroxolyn for a day and it seems to get better. This may happen as frequently as every 3 or 4 days. He denies any chest pains, shortness of breath, or palpitations. He inquired about going back to Coumadin but he was never good at getting blood tests on the Coumadin and I refused to give it to him this is not safe. I told him that he has to go see a urologist about his gross hematuria.\par February 3, 2020: The patient complains of pains radiating from his back down his left leg.  It hurts when he stands.  He has been working out in a gym.  He has tried Tylenol but it is not helped.  He is still on the Xarelto.\par January 21, 2021: The patient is suffering from right-sided sciatica.  He has spinal stenosis.  He is still on Xarelto for hypercoagulable state.  He denies any chest pains, shortness of breath, or palpitations.  In order for him to get an epidural shot the patient will have to go off his Xarelto for at least 3 days and possibly as long as 7 days.  He is on gabapentin 300 mg.

## 2021-03-31 ENCOUNTER — RX RENEWAL (OUTPATIENT)
Age: 81
End: 2021-03-31

## 2021-04-05 ENCOUNTER — RX RENEWAL (OUTPATIENT)
Age: 81
End: 2021-04-05

## 2021-06-05 ENCOUNTER — RX RENEWAL (OUTPATIENT)
Age: 81
End: 2021-06-05

## 2021-10-31 ENCOUNTER — RX RENEWAL (OUTPATIENT)
Age: 81
End: 2021-10-31

## 2022-02-10 NOTE — DISCUSSION/SUMMARY
[FreeTextEntry1] : \par \par The patient was examined. His blood pressure was 141/86 mmHg. His pulse was 68 bpm. His lungs were clear to auscultation. Cardiac exam was negative for murmurs rubs or gallops. His extremities showed no cyanosis or clubbing his EKG showed normal sinus rhythm and an IVCD.  This was unchanged from previous..We will continue him on Xarelto at 20 mg daily.  He will return in 6 months.. The patient is cardiologically cleared for epidural injections.  He may go off of his Xarelto for 3 days prior to the injection and can be off of it for a maximum of 7 days. What Type Of Note Output Would You Prefer (Optional)?: Standard Output On A Scale Of 0 To 10 How Would You Rate Your Itching?: 4 How Did Your Itching Occur?: sudden in onset (over a period of weeks to a few months) How Severe Is Your Itching?: mild

## 2022-02-11 ENCOUNTER — RX RENEWAL (OUTPATIENT)
Age: 82
End: 2022-02-11

## 2022-03-18 ENCOUNTER — RX RENEWAL (OUTPATIENT)
Age: 82
End: 2022-03-18

## 2022-04-04 ENCOUNTER — RX RENEWAL (OUTPATIENT)
Age: 82
End: 2022-04-04

## 2022-06-15 ENCOUNTER — RX RENEWAL (OUTPATIENT)
Age: 82
End: 2022-06-15

## 2022-06-16 ENCOUNTER — RX RENEWAL (OUTPATIENT)
Age: 82
End: 2022-06-16

## 2022-08-22 ENCOUNTER — RX RENEWAL (OUTPATIENT)
Age: 82
End: 2022-08-22

## 2023-01-03 ENCOUNTER — APPOINTMENT (OUTPATIENT)
Dept: FAMILY MEDICINE | Facility: CLINIC | Age: 83
End: 2023-01-03
Payer: MEDICARE

## 2023-01-09 ENCOUNTER — APPOINTMENT (OUTPATIENT)
Dept: FAMILY MEDICINE | Facility: CLINIC | Age: 83
End: 2023-01-09
Payer: MEDICARE

## 2023-01-09 ENCOUNTER — NON-APPOINTMENT (OUTPATIENT)
Age: 83
End: 2023-01-09

## 2023-01-09 VITALS
OXYGEN SATURATION: 97 % | WEIGHT: 204 LBS | TEMPERATURE: 97.5 F | SYSTOLIC BLOOD PRESSURE: 132 MMHG | DIASTOLIC BLOOD PRESSURE: 74 MMHG | RESPIRATION RATE: 16 BRPM | BODY MASS INDEX: 27.04 KG/M2 | HEART RATE: 63 BPM | HEIGHT: 73 IN

## 2023-01-09 DIAGNOSIS — R31.0 GROSS HEMATURIA: ICD-10-CM

## 2023-01-09 DIAGNOSIS — I45.4 NONSPECIFIC INTRAVENTRICULAR BLOCK: ICD-10-CM

## 2023-01-09 DIAGNOSIS — H91.93 UNSPECIFIED HEARING LOSS, BILATERAL: ICD-10-CM

## 2023-01-09 DIAGNOSIS — Z00.00 ENCOUNTER FOR GENERAL ADULT MEDICAL EXAMINATION W/OUT ABNORMAL FINDINGS: ICD-10-CM

## 2023-01-09 DIAGNOSIS — C85.90 NON-HODGKIN LYMPHOMA, UNSPECIFIED, UNSPECIFIED SITE: ICD-10-CM

## 2023-01-09 PROCEDURE — 93000 ELECTROCARDIOGRAM COMPLETE: CPT

## 2023-01-09 PROCEDURE — G0439: CPT

## 2023-01-09 NOTE — HISTORY OF PRESENT ILLNESS
[FreeTextEntry1] : CPE [de-identified] : overall is feeling well\par would like to establish care\par denies any other associated symptoms \par \par - rash on right side of chest\par seen by derm \par worried if long covid dermatitis \par been present for months \par using a salve which helps\par denies any other associated symptoms \par \par - 3-4 years ago \par diagnosed with spinal stenosis\par seen by neurologist\par taking gabapentin 300 mg qhs \par which has helping \par was a prior very active  up until 1-2 years ago \par denies any other complaints or concerns at this time

## 2023-01-09 NOTE — HEALTH RISK ASSESSMENT
[Never] : Never [Yes] : Yes [Monthly or less (1 pt)] : Monthly or less (1 point) [1 or 2 (0 pts)] : 1 or 2 (0 points) [Never (0 pts)] : Never (0 points) [No] : In the past 12 months have you used drugs other than those required for medical reasons? No [No falls in past year] : Patient reported no falls in the past year [0] : 2) Feeling down, depressed, or hopeless: Not at all (0) [PHQ-2 Negative - No further assessment needed] : PHQ-2 Negative - No further assessment needed [Patient reported colonoscopy was normal] : Patient reported colonoscopy was normal [HIV test declined] : HIV test declined [Hepatitis C test declined] : Hepatitis C test declined [None] : None [With Family] : lives with family [Employed] : employed [] :  [# Of Children ___] : has [unfilled] children [Feels Safe at Home] : Feels safe at home [Fully functional (bathing, dressing, toileting, transferring, walking, feeding)] : Fully functional (bathing, dressing, toileting, transferring, walking, feeding) [Fully functional (using the telephone, shopping, preparing meals, housekeeping, doing laundry, using] : Fully functional and needs no help or supervision to perform IADLs (using the telephone, shopping, preparing meals, housekeeping, doing laundry, using transportation, managing medications and managing finances) [Excellent] : ~his/her~  mood as  excellent [With Patient/Caregiver] : , with patient/caregiver [FreeTextEntry1] : ^ see above  [de-identified] : none [de-identified] : none [Audit-CScore] : 1  [de-identified] : walking  [de-identified] : balanced; supplements- none [[id number]] : 0 [Change in mental status noted] : No change in mental status noted [Language] : denies difficulty with language [Reports changes in hearing] : Reports no changes in hearing [Reports changes in vision] : Reports no changes in vision [ColonoscopyDate] : 2018  [ColonoscopyComments] : 2-3 years ago, Dr. 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

## 2023-01-09 NOTE — ASSESSMENT
[FreeTextEntry1] : Routine medical examination\par VSS- exam normal \par c/w current medications\par Advised healthy diet and exercise\par will follow up labs \par referred to cardio and urology \par received flu shot \par advised shingles vaccine\par patient verbalizes understanding and patient is stable upon discharge\par

## 2023-01-18 RX ORDER — LOSARTAN POTASSIUM AND HYDROCHLOROTHIAZIDE 12.5; 5 MG/1; MG/1
50-12.5 TABLET ORAL DAILY
Qty: 90 | Refills: 1 | Status: COMPLETED | COMMUNITY
Start: 2017-03-02 | End: 2023-01-18

## 2023-01-18 RX ORDER — GABAPENTIN 100 MG/1
100 CAPSULE ORAL 3 TIMES DAILY
Qty: 90 | Refills: 1 | Status: COMPLETED | COMMUNITY
Start: 2020-02-03 | End: 2023-01-18

## 2023-01-19 ENCOUNTER — NON-APPOINTMENT (OUTPATIENT)
Age: 83
End: 2023-01-19

## 2023-05-16 ENCOUNTER — RX RENEWAL (OUTPATIENT)
Age: 83
End: 2023-05-16

## 2023-06-26 ENCOUNTER — APPOINTMENT (OUTPATIENT)
Dept: OTOLARYNGOLOGY | Facility: CLINIC | Age: 83
End: 2023-06-26

## 2023-10-02 ENCOUNTER — RX RENEWAL (OUTPATIENT)
Age: 83
End: 2023-10-02

## 2023-10-20 LAB
25(OH)D3 SERPL-MCNC: 52 NG/ML
ALBUMIN SERPL ELPH-MCNC: 4.6 G/DL
ALP BLD-CCNC: 93 U/L
ALT SERPL-CCNC: 19 U/L
ANION GAP SERPL CALC-SCNC: 13 MMOL/L
APPEARANCE: CLEAR
AST SERPL-CCNC: 25 U/L
BACTERIA: NEGATIVE
BASOPHILS # BLD AUTO: 0.02 K/UL
BASOPHILS NFR BLD AUTO: 0.3 %
BILIRUB DIRECT SERPL-MCNC: 0.2 MG/DL
BILIRUB INDIRECT SERPL-MCNC: 0.5 MG/DL
BILIRUB SERPL-MCNC: 0.7 MG/DL
BILIRUBIN URINE: NEGATIVE
BLOOD URINE: NEGATIVE
BUN SERPL-MCNC: 29 MG/DL
CALCIUM SERPL-MCNC: 9.6 MG/DL
CHLORIDE SERPL-SCNC: 104 MMOL/L
CHOLEST SERPL-MCNC: 216 MG/DL
CO2 SERPL-SCNC: 25 MMOL/L
COLOR: NORMAL
CREAT SERPL-MCNC: 1.11 MG/DL
EGFR: 67 ML/MIN/1.73M2
EOSINOPHIL # BLD AUTO: 0.09 K/UL
EOSINOPHIL NFR BLD AUTO: 1.5 %
ESTIMATED AVERAGE GLUCOSE: 100 MG/DL
FOLATE SERPL-MCNC: 15.8 NG/ML
GLUCOSE QUALITATIVE U: NEGATIVE
GLUCOSE SERPL-MCNC: 97 MG/DL
HBA1C MFR BLD HPLC: 5.1 %
HCT VFR BLD CALC: 47.8 %
HDLC SERPL-MCNC: 84 MG/DL
HGB BLD-MCNC: 15.3 G/DL
HYALINE CASTS: 1 /LPF
IMM GRANULOCYTES NFR BLD AUTO: 1 %
IRON SERPL-MCNC: 92 UG/DL
KETONES URINE: NEGATIVE
LDLC SERPL CALC-MCNC: 117 MG/DL
LEUKOCYTE ESTERASE URINE: NEGATIVE
LYMPHOCYTES # BLD AUTO: 0.61 K/UL
LYMPHOCYTES NFR BLD AUTO: 10.4 %
MAN DIFF?: NORMAL
MCHC RBC-ENTMCNC: 32 GM/DL
MCHC RBC-ENTMCNC: 32.8 PG
MCV RBC AUTO: 102.4 FL
MICROSCOPIC-UA: NORMAL
MONOCYTES # BLD AUTO: 0.37 K/UL
MONOCYTES NFR BLD AUTO: 6.3 %
NEUTROPHILS # BLD AUTO: 4.71 K/UL
NEUTROPHILS NFR BLD AUTO: 80.5 %
NITRITE URINE: NEGATIVE
NONHDLC SERPL-MCNC: 132 MG/DL
PH URINE: 6
PLATELET # BLD AUTO: 221 K/UL
POTASSIUM SERPL-SCNC: 4.4 MMOL/L
PROT SERPL-MCNC: 7.2 G/DL
PROTEIN URINE: NEGATIVE
PSA FREE FLD-MCNC: 26 %
PSA FREE SERPL-MCNC: 1.16 NG/ML
PSA SERPL-MCNC: 4.49 NG/ML
RBC # BLD: 4.67 M/UL
RBC # FLD: 17 %
RED BLOOD CELLS URINE: 0 /HPF
SODIUM SERPL-SCNC: 142 MMOL/L
SPECIFIC GRAVITY URINE: 1.01
SQUAMOUS EPITHELIAL CELLS: 0 /HPF
T4 FREE SERPL-MCNC: 1.1 NG/DL
TRIGL SERPL-MCNC: 72 MG/DL
TSH SERPL-ACNC: 2.07 UIU/ML
UROBILINOGEN URINE: NORMAL
VIT B12 SERPL-MCNC: 676 PG/ML
WBC # FLD AUTO: 5.86 K/UL
WHITE BLOOD CELLS URINE: 1 /HPF

## 2023-10-20 RX ORDER — GABAPENTIN 300 MG/1
300 CAPSULE ORAL
Qty: 270 | Refills: 2 | Status: ACTIVE | COMMUNITY
Start: 2023-01-18 | End: 1900-01-01

## 2023-11-07 ENCOUNTER — APPOINTMENT (OUTPATIENT)
Dept: FAMILY MEDICINE | Facility: CLINIC | Age: 83
End: 2023-11-07

## 2023-12-31 ENCOUNTER — RX RENEWAL (OUTPATIENT)
Age: 83
End: 2023-12-31

## 2023-12-31 RX ORDER — HYDROCHLOROTHIAZIDE 12.5 MG/1
12.5 CAPSULE ORAL
Qty: 90 | Refills: 1 | Status: ACTIVE | COMMUNITY
Start: 2020-05-09 | End: 1900-01-01

## 2024-03-25 ENCOUNTER — RX RENEWAL (OUTPATIENT)
Age: 84
End: 2024-03-25

## 2024-03-25 RX ORDER — LOSARTAN POTASSIUM 50 MG/1
50 TABLET, FILM COATED ORAL
Qty: 90 | Refills: 1 | Status: ACTIVE | COMMUNITY
Start: 2019-05-16 | End: 1900-01-01

## 2024-06-04 ENCOUNTER — APPOINTMENT (OUTPATIENT)
Dept: PAIN MANAGEMENT | Facility: CLINIC | Age: 84
End: 2024-06-04
Payer: MEDICARE

## 2024-06-04 VITALS — HEIGHT: 73 IN | BODY MASS INDEX: 30.48 KG/M2 | WEIGHT: 230 LBS

## 2024-06-04 DIAGNOSIS — M48.061 SPINAL STENOSIS, LUMBAR REGION WITHOUT NEUROGENIC CLAUDICATION: ICD-10-CM

## 2024-06-04 PROCEDURE — 99204 OFFICE O/P NEW MOD 45 MIN: CPT

## 2024-06-04 RX ORDER — GABAPENTIN 300 MG/1
300 CAPSULE ORAL 3 TIMES DAILY
Qty: 270 | Refills: 0 | Status: ACTIVE | COMMUNITY
Start: 2024-06-04 | End: 1900-01-01

## 2024-06-04 NOTE — PHYSICAL EXAM
[] : no thoracic paraspinal spasm [Extension] : extension [4___] : right dorsiflexors 4[unfilled]/5 [TWNoteComboBox7] : forward flexion 75 degrees [de-identified] : extension 5 degrees

## 2024-06-04 NOTE — ASSESSMENT
[FreeTextEntry1] : After discussing various treatment options with the patient including but not limited to oral medications, physical therapy, exercise, modalities as well as interventional spinal injections, we have decided with the following plan:  1) Intervention Injection Therapy: I personally reviewed the MRI/CT scan images and agree with the radiologist's report. The radiological findings were discussed with the patient. The risks, benefits, contents and alternatives to injection were explained in full to the patient. Risks outlined include but are not limited to infection,sepsis, bleeding, post-dural puncture headache, nerve damage, temporary increase in pain, syncopal episode, failure to resolve symptoms, allergic reaction, symptom recurrence, and elevation of blood sugar in diabetics. Cortisone may cause immunosuppression. Patient understands the risks. All questions were answered. After discussion of options, patient requested an injection. Information regarding the injection was given to the patient. Which medications to stop prior to the injection was explained to the patient as well.  Follow up in 1-2 weeks post injection for re-evaluation.  Continue Home exercises, stretching, activity modification, physical therapy, and conservative care.  right L4/5 L5/S1   severe stenosis on MRI, if no relief would get new MRI.

## 2024-06-04 NOTE — HISTORY OF PRESENT ILLNESS
[Right Leg] : right leg [9] : 9 [0] : 0 [Burning] : burning [Dull/Aching] : dull/aching [Radiating] : radiating [Sharp] : sharp [Shooting] : shooting [Stabbing] : stabbing [Intermittent] : intermittent [Household chores] : household chores [Leisure] : leisure [Rest] : rest [Sitting] : sitting [Standing] : standing [Walking] : walking [Stairs] : stairs [FreeTextEntry1] : 06/04/2024 : Patient presents for initial evaluation. He is having pain on his right leg for the last 4 years. Pain in the right thigh and down to the calf. Pain only when lifting anything heavy. He is taking gabapentin (2700mg) and had STACEY's about 4 years ago. After 2 epidurals he had no pain for about 1.5 years. About 4-5 months ago pain recurred. Had STACEY about 10 weeks ago by Dr. Coyle without relief.  No recent PT.   Subjective Weakness: Yes Numbness/Tingling: Yes; Feet   Bladder/Bowel dysfunction: Yes Treatments Tried: Medication, epidurals, physical therapy Attempted modalities for current pain complaint:  See above:  Medications: Yes     Injections: Yes- Dr. Coyle Previous Spine Surgery: No Imaging:  MRI Lumbar Spine: not recent.  [] : Post Surgical Visit: no [FreeTextEntry7] : calf [de-identified] : run, twist, putting weight on it

## 2024-06-12 ENCOUNTER — APPOINTMENT (OUTPATIENT)
Dept: FAMILY MEDICINE | Facility: CLINIC | Age: 84
End: 2024-06-12
Payer: MEDICARE

## 2024-06-12 ENCOUNTER — NON-APPOINTMENT (OUTPATIENT)
Age: 84
End: 2024-06-12

## 2024-06-12 VITALS
HEART RATE: 64 BPM | RESPIRATION RATE: 18 BRPM | HEIGHT: 73 IN | OXYGEN SATURATION: 96 % | SYSTOLIC BLOOD PRESSURE: 136 MMHG | WEIGHT: 230 LBS | DIASTOLIC BLOOD PRESSURE: 84 MMHG | TEMPERATURE: 98.3 F | BODY MASS INDEX: 30.48 KG/M2

## 2024-06-12 DIAGNOSIS — Z01.818 ENCOUNTER FOR OTHER PREPROCEDURAL EXAMINATION: ICD-10-CM

## 2024-06-12 DIAGNOSIS — I82.409 ACUTE EMBOLISM AND THROMBOSIS OF UNSPECIFIED DEEP VEINS OF UNSPECIFIED LOWER EXTREMITY: ICD-10-CM

## 2024-06-12 DIAGNOSIS — Z79.01 LONG TERM (CURRENT) USE OF ANTICOAGULANTS: ICD-10-CM

## 2024-06-12 DIAGNOSIS — I10 ESSENTIAL (PRIMARY) HYPERTENSION: ICD-10-CM

## 2024-06-12 DIAGNOSIS — E78.5 HYPERLIPIDEMIA, UNSPECIFIED: ICD-10-CM

## 2024-06-12 PROCEDURE — 93000 ELECTROCARDIOGRAM COMPLETE: CPT

## 2024-06-12 PROCEDURE — 99214 OFFICE O/P EST MOD 30 MIN: CPT | Mod: 25

## 2024-06-12 RX ORDER — RIVAROXABAN 20 MG/1
20 TABLET, FILM COATED ORAL
Qty: 30 | Refills: 5 | Status: ACTIVE | COMMUNITY
Start: 2019-02-13 | End: 1900-01-01

## 2024-06-13 PROBLEM — E78.5 HYPERLIPIDEMIA: Status: ACTIVE | Noted: 2018-04-24

## 2024-06-13 NOTE — HISTORY OF PRESENT ILLNESS
[No Pertinent Cardiac History] : no history of aortic stenosis, atrial fibrillation, coronary artery disease, recent myocardial infarction, or implantable device/pacemaker [No Pertinent Pulmonary History] : no history of asthma, COPD, sleep apnea, or smoking [No Adverse Anesthesia Reaction] : no adverse anesthesia reaction in self or family member [Chronic Anticoagulation] : chronic anticoagulation [(Patient denies any chest pain, claudication, dyspnea on exertion, orthopnea, palpitations or syncope)] : Patient denies any chest pain, claudication, dyspnea on exertion, orthopnea, palpitations or syncope [____ METs%] : [unfilled] METs% [Anti-Platelet Agents: _____] : Anti-Platelet Agents: [unfilled] [Anticoagulants: _____] : Anticoagulants: [unfilled] [NSAIDs: _____] : NSAIDs: [unfilled] [Herbal Supplements: _____] : Herbal Supplements: [unfilled] [Aortic Stenosis] : no aortic stenosis [Atrial Fibrillation] : no atrial fibrillation [Coronary Artery Disease] : no coronary artery disease [Recent Myocardial Infarction] : no recent myocardial infarction [Implantable Device/Pacemaker] : no implantable device/pacemaker [Asthma] : no asthma [COPD] : no COPD [Sleep Apnea] : no sleep apnea [Smoker] : not a smoker [Family Member] : no family member with adverse anesthesia reaction/sudden death [Self] : no previous adverse anesthesia reaction [Chronic Kidney Disease] : no chronic kidney disease [Diabetes] : no diabetes [FreeTextEntry1] : right lumbar epidual  [FreeTextEntry2] : 6/18/2024 [FreeTextEntry3] : Dr. Brooks [FreeTextEntry4] : 82 yo m, pmhx of DVT on xarelto, needs epidural for lumbar spine  overall is feeling well denies any other associated symptoms denies any other complaints or concerns at this time  [FreeTextEntry5] : + DVT on xarelto  [FreeTextEntry7] : cardio Dr. López (2021): continue on Xarelto at 20 mg daily. The patient is cardio cardiologically cleared for epidural injections. He may go off of his Xarelto for 3 days prior to the injection and can be off of it for a maximum of 7 days.

## 2024-06-13 NOTE — ASSESSMENT
[Patient Optimized for Surgery] : Patient optimized for surgery [No Further Testing Recommended] : no further testing recommended [As per surgery] : as per surgery [FreeTextEntry7] :  cardio Dr. López (2021): continue on Xarelto at 20 mg daily. The patient is cardio cardiologically cleared for epidural injections. He may go off of his Xarelto for 3 days prior to the injection and can be off of it for a maximum of 7 days.

## 2024-06-18 ENCOUNTER — APPOINTMENT (OUTPATIENT)
Dept: PAIN MANAGEMENT | Facility: CLINIC | Age: 84
End: 2024-06-18
Payer: MEDICARE

## 2024-06-18 DIAGNOSIS — M54.31 SCIATICA, RIGHT SIDE: ICD-10-CM

## 2024-06-18 PROCEDURE — 64483 NJX AA&/STRD TFRM EPI L/S 1: CPT | Mod: RT

## 2024-06-18 PROCEDURE — 64484 NJX AA&/STRD TFRM EPI L/S EA: CPT | Mod: 59,RT

## 2024-06-18 NOTE — PROCEDURE
[FreeTextEntry3] : Date of Service: 06/18/2024   Account: [id number]   Patient: FLORENCIA ESPINOZA   YOB: 1940   Age: 83 year     Surgeon: Myra Schilling M.D.   Assistant: None.   Pre-Operative Diagnosis: Lumbosacral Radiculitis (M54.17)   Post Operative Diagnosis: Lumbosacral Radiculitis (M54.17)   Procedure: Right L4-5, L5-S1 transforaminal epidural steroid injection under fluoroscopic guidance.   Anesthesia:            Local     This procedure was carried out using fluoroscopic guidance.  The risks and benefits of the procedure were discussed extensively with the patient.  The consent of the patient was obtained and the following procedure was performed. The patient was placed in the prone position on the fluoroscopic table and the lumbar area was prepped and draped in a sterile fashion.   The right L4-5 and L5-S1 neural foramen were identified on right oblique  "ramana dog" anatomical view at the 6 o' clock position using fluoroscopic guidance, and the area was marked. The overlying skin and subcutaneous structures were anesthetized using sterile technique with 1% Lidocaine.  A 22 gauge spinal needle was directed toward the inferior (6o'clock) position of the pedicle, which formed the roof of the identified foramen.  Once in the epidural space, after negative aspiration for heme and CSF, 1cc of Omnipaque contrast was injected to confirm epidural location and assess filling defects and rule out intravascular needle placement.   The following contrast flow and epidurogram was observed: no intravascular or intrathecal flow pattern was noted.  No blood or CSF was aspirated. Omnipaque spread appeared to outline the right L4 and L5 nerve roots and spread medially into the epidural space.    After this, an injectate of 3 cc preservative free normal saline plus 40 mg of Kenalog was injected in the epidural space at each of the two levels.   The needle was subsequently removed.  Vital signs remained normal.  Pulse oximeter was used throughout the procedure and the patient's pulse and oxygen saturation remained within normal limits.  The patient tolerated the procedure well.  There were no complications.  The patient was instructed to apply ice over the injection sites for twenty minutes every two hours for the next 24 to 48 hours.  The patient was also instructed to contact me immediately if there were any problems.   Myra Schilling M.D.

## 2024-07-02 ENCOUNTER — LABORATORY RESULT (OUTPATIENT)
Age: 84
End: 2024-07-02

## 2024-07-11 ENCOUNTER — APPOINTMENT (OUTPATIENT)
Dept: FAMILY MEDICINE | Facility: CLINIC | Age: 84
End: 2024-07-11
Payer: MEDICARE

## 2024-07-11 VITALS
TEMPERATURE: 97.7 F | DIASTOLIC BLOOD PRESSURE: 70 MMHG | HEART RATE: 69 BPM | RESPIRATION RATE: 18 BRPM | SYSTOLIC BLOOD PRESSURE: 124 MMHG | WEIGHT: 230 LBS | OXYGEN SATURATION: 95 % | HEIGHT: 73 IN | BODY MASS INDEX: 30.48 KG/M2

## 2024-07-11 DIAGNOSIS — Z85.72 PERSONAL HISTORY OF NON-HODGKIN LYMPHOMAS: ICD-10-CM

## 2024-07-11 DIAGNOSIS — I45.4 NONSPECIFIC INTRAVENTRICULAR BLOCK: ICD-10-CM

## 2024-07-11 DIAGNOSIS — I82.409 ACUTE EMBOLISM AND THROMBOSIS OF UNSPECIFIED DEEP VEINS OF UNSPECIFIED LOWER EXTREMITY: ICD-10-CM

## 2024-07-11 DIAGNOSIS — H91.93 UNSPECIFIED HEARING LOSS, BILATERAL: ICD-10-CM

## 2024-07-11 DIAGNOSIS — Z00.00 ENCOUNTER FOR GENERAL ADULT MEDICAL EXAMINATION W/OUT ABNORMAL FINDINGS: ICD-10-CM

## 2024-07-11 DIAGNOSIS — Z79.01 LONG TERM (CURRENT) USE OF ANTICOAGULANTS: ICD-10-CM

## 2024-07-11 DIAGNOSIS — I10 ESSENTIAL (PRIMARY) HYPERTENSION: ICD-10-CM

## 2024-07-11 DIAGNOSIS — R31.9 HEMATURIA, UNSPECIFIED: ICD-10-CM

## 2024-07-11 DIAGNOSIS — Z12.11 ENCOUNTER FOR SCREENING FOR MALIGNANT NEOPLASM OF COLON: ICD-10-CM

## 2024-07-11 DIAGNOSIS — Z87.898 PERSONAL HISTORY OF OTHER SPECIFIED CONDITIONS: ICD-10-CM

## 2024-07-11 DIAGNOSIS — R97.20 ELEVATED PROSTATE, SPECIFIC ANTIGEN [PSA]: ICD-10-CM

## 2024-07-11 DIAGNOSIS — M48.061 SPINAL STENOSIS, LUMBAR REGION WITHOUT NEUROGENIC CLAUDICATION: ICD-10-CM

## 2024-07-11 DIAGNOSIS — E78.5 HYPERLIPIDEMIA, UNSPECIFIED: ICD-10-CM

## 2024-07-11 PROCEDURE — G0439: CPT

## 2024-09-09 ENCOUNTER — APPOINTMENT (OUTPATIENT)
Dept: GASTROENTEROLOGY | Facility: CLINIC | Age: 84
End: 2024-09-09

## 2024-09-09 VITALS
BODY MASS INDEX: 29.42 KG/M2 | HEIGHT: 73 IN | WEIGHT: 222 LBS | DIASTOLIC BLOOD PRESSURE: 84 MMHG | HEART RATE: 66 BPM | TEMPERATURE: 98 F | OXYGEN SATURATION: 98 % | SYSTOLIC BLOOD PRESSURE: 155 MMHG

## 2024-09-09 DIAGNOSIS — R19.4 CHANGE IN BOWEL HABIT: ICD-10-CM

## 2024-09-09 PROCEDURE — G2211 COMPLEX E/M VISIT ADD ON: CPT

## 2024-09-09 PROCEDURE — 99203 OFFICE O/P NEW LOW 30 MIN: CPT

## 2024-09-09 NOTE — HISTORY OF PRESENT ILLNESS
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   He stopped the Xarelto and the hematuria ceased.  He resumed the Xarelto and the hematuria recurred His PCP had referred him for a urological consultation for an elevated PSA  No history of CAD No chest pain or shortness of breath

## 2024-09-09 NOTE — ASSESSMENT
[FreeTextEntry1] :  History of colonic lymphoma, history of colon polyps Can schedule surveillance colonoscopy He understands that at the age of 84 the decision to continue with colonoscopic surveillance is a judgement call Discussed risks of the procedure -  including but not limited to bleeding, infection, drug reaction, perforation and missed lesion.  Also discussed benefits and alternatives of this procedure with patient - including no treatment  - and the patient consents to undergoing the procedure.  Patient understands that he is to stop his Xarelto to complete days prior to the colonoscopy   Hematuria Patient was already referred for urology consultation for his elevated PSA

## 2024-09-09 NOTE — REASON FOR VISIT
[Follow-up] : a follow-up of an existing diagnosis [FreeTextEntry1] : History of colonic lymphoma, Colon polyps

## 2024-09-18 NOTE — REVIEW OF SYSTEMS
[see HPI] : see HPI [Negative] : Heme/Lymph Dapsone Pregnancy And Lactation Text: This medication is Pregnancy Category C and is not considered safe during pregnancy or breast feeding.

## 2024-09-19 ENCOUNTER — APPOINTMENT (OUTPATIENT)
Dept: UROLOGY | Facility: CLINIC | Age: 84
End: 2024-09-19
Payer: MEDICARE

## 2024-09-19 VITALS
DIASTOLIC BLOOD PRESSURE: 75 MMHG | WEIGHT: 222 LBS | BODY MASS INDEX: 29.42 KG/M2 | HEIGHT: 73 IN | HEART RATE: 72 BPM | TEMPERATURE: 97.9 F | SYSTOLIC BLOOD PRESSURE: 152 MMHG | RESPIRATION RATE: 17 BRPM

## 2024-09-19 DIAGNOSIS — R31.29 OTHER MICROSCOPIC HEMATURIA: ICD-10-CM

## 2024-09-19 DIAGNOSIS — N13.8 BENIGN PROSTATIC HYPERPLASIA WITH LOWER URINARY TRACT SYMPMS: ICD-10-CM

## 2024-09-19 DIAGNOSIS — R31.9 HEMATURIA, UNSPECIFIED: ICD-10-CM

## 2024-09-19 DIAGNOSIS — N40.1 BENIGN PROSTATIC HYPERPLASIA WITH LOWER URINARY TRACT SYMPMS: ICD-10-CM

## 2024-09-19 PROCEDURE — 99203 OFFICE O/P NEW LOW 30 MIN: CPT

## 2024-09-19 RX ORDER — FINASTERIDE 5 MG/1
5 TABLET, FILM COATED ORAL
Qty: 90 | Refills: 3 | Status: ACTIVE | COMMUNITY
Start: 2024-09-19 | End: 1900-01-01

## 2024-09-19 NOTE — HISTORY OF PRESENT ILLNESS
[FreeTextEntry1] : FLORENCIA ESPINOZA presents to the office today. He is a 84 year-old man here for gross hematuria. He reports that he has been seeing gross hematuria but when he stops his Xarelto the bleeding stops. He has seen hematuria in the past but many years ago.   He smoked for 10 years, 1 pack to 1.5 packs a day.   He saw Dr. Begum many years ago for urinary retention. Unsure what procedure was performed at that time.   He has a history of lymphoma about 20 years ago. He is having a colonoscopy with Dr. Jeter in the next coming weeks. He has a history of stenosis and recently started Gabapentin.   PSA in July was 5.57ng/mL with 27% free fraction.

## 2024-09-19 NOTE — ASSESSMENT
[FreeTextEntry1] : UA/UC today   CT Urogram ordered for gross hematuria (high risk due to age, smoking history)  Cysto with Dr. Rajan  Prescribed Finasteride as bleeding can be coming from the prostate, advised that the effects may not be seen right away. Goals of medication reviewed. Discussed potential side effects of medications. Discussed proper administration of medication.    PSA has not increased significantly from 2019, free fraction is 27%. No intervention at this time.

## 2024-09-23 LAB
APPEARANCE: ABNORMAL
BACTERIA UR CULT: NORMAL
BACTERIA: NEGATIVE /HPF
BILIRUBIN URINE: NEGATIVE
BLOOD URINE: ABNORMAL
CAST: 0 /LPF
COLOR: ABNORMAL
EPITHELIAL CELLS: 0 /HPF
GLUCOSE QUALITATIVE U: NEGATIVE MG/DL
KETONES URINE: NEGATIVE MG/DL
LEUKOCYTE ESTERASE URINE: ABNORMAL
MICROSCOPIC-UA: NORMAL
NITRITE URINE: NEGATIVE
PH URINE: 6
PROTEIN URINE: 100 MG/DL
RED BLOOD CELLS URINE: >1900 /HPF
SPECIFIC GRAVITY URINE: 1.01
UROBILINOGEN URINE: 0.2 MG/DL
WHITE BLOOD CELLS URINE: 3 /HPF

## 2024-09-29 ENCOUNTER — NON-APPOINTMENT (OUTPATIENT)
Age: 84
End: 2024-09-29

## 2024-10-07 ENCOUNTER — APPOINTMENT (OUTPATIENT)
Dept: GASTROENTEROLOGY | Facility: CLINIC | Age: 84
End: 2024-10-07
Payer: MEDICARE

## 2024-10-07 ENCOUNTER — LABORATORY RESULT (OUTPATIENT)
Age: 84
End: 2024-10-07

## 2024-10-07 PROCEDURE — 45385 COLONOSCOPY W/LESION REMOVAL: CPT

## 2024-10-07 PROCEDURE — 45380 COLONOSCOPY AND BIOPSY: CPT | Mod: 59

## 2025-03-20 ENCOUNTER — RX RENEWAL (OUTPATIENT)
Age: 85
End: 2025-03-20

## 2025-05-23 ENCOUNTER — RX RENEWAL (OUTPATIENT)
Age: 85
End: 2025-05-23

## 2025-08-26 ENCOUNTER — RX RENEWAL (OUTPATIENT)
Age: 85
End: 2025-08-26